# Patient Record
Sex: FEMALE | Race: WHITE
[De-identification: names, ages, dates, MRNs, and addresses within clinical notes are randomized per-mention and may not be internally consistent; named-entity substitution may affect disease eponyms.]

---

## 2018-06-17 ENCOUNTER — HOSPITAL ENCOUNTER (EMERGENCY)
Dept: HOSPITAL 92 - ERS | Age: 23
Discharge: HOME | End: 2018-06-17
Payer: SELF-PAY

## 2018-06-17 DIAGNOSIS — S56.022A: Primary | ICD-10-CM

## 2018-06-17 DIAGNOSIS — Z23: ICD-10-CM
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
## 2018-06-17 PROCEDURE — 12001 RPR S/N/AX/GEN/TRNK 2.5CM/<: CPT

## 2018-06-17 PROCEDURE — 90471 IMMUNIZATION ADMIN: CPT

## 2018-06-17 PROCEDURE — 90715 TDAP VACCINE 7 YRS/> IM: CPT

## 2018-06-17 NOTE — RAD
LEFT HAND THREE VIEWS:

 

06/17/2018

 

HISTORY:

Hand laceration.  Syncope.

 

COMPARISON:

None.

 

FINDINGS:

There is soft tissue irregularity at the base of the thumb, adjacent to the first proximal phalanx, e
vidence of laceration.  No radiopaque foreign body.  No acute fracture or evidence of dislocation is 
noted.

 

IMPRESSION:

No fracture or foreign body seen.

 

POS: [facility]